# Patient Record
Sex: MALE | Race: OTHER | HISPANIC OR LATINO | URBAN - METROPOLITAN AREA
[De-identification: names, ages, dates, MRNs, and addresses within clinical notes are randomized per-mention and may not be internally consistent; named-entity substitution may affect disease eponyms.]

---

## 2020-12-18 ENCOUNTER — EMERGENCY (EMERGENCY)
Facility: HOSPITAL | Age: 40
LOS: 1 days | Discharge: HOME | End: 2020-12-18
Attending: EMERGENCY MEDICINE
Payer: COMMERCIAL

## 2020-12-18 VITALS
SYSTOLIC BLOOD PRESSURE: 166 MMHG | OXYGEN SATURATION: 99 % | HEART RATE: 90 BPM | HEIGHT: 70 IN | WEIGHT: 315 LBS | RESPIRATION RATE: 18 BRPM | DIASTOLIC BLOOD PRESSURE: 63 MMHG | TEMPERATURE: 99 F

## 2020-12-18 LAB — SARS-COV-2 RNA SPEC QL NAA+PROBE: SIGNIFICANT CHANGE UP

## 2020-12-18 PROCEDURE — 87635 SARS-COV-2 COVID-19 AMP PRB: CPT

## 2020-12-18 PROCEDURE — 99283 EMERGENCY DEPT VISIT LOW MDM: CPT

## 2020-12-18 NOTE — ED PROVIDER NOTE - PATIENT PORTAL LINK FT
You can access the FollowMyHealth Patient Portal offered by [facility] by registering at the following website: [email or web address]. By joining [facility]’s FollowMyHealth portal, you will also be able to view your health information using other applications (apps) compatible with our system.

## 2020-12-18 NOTE — ED PROVIDER NOTE - OBJECTIVE STATEMENT
41 yo male with no PMHX presenting to ED requesting COVID swab for return to work. Patient denies illness, fever, SOB, or malaise.

## 2021-01-28 NOTE — ED ADULT TRIAGE NOTE - NS ED NURSE DIRECT TO ROOM YN
OFFICE VISIT      Patient: Sav Nichole Date of Service: 2021   : 1999 MRN: [id number]     SUBJECTIVE:   HISTORY OF PRESENT ILLNESS:  HPI    Routine physical.  Initial visit.  Patient also complains of left hand pain for 1 year.  Was angry and tried to punch his car window.  Was swollen at the time.  Still with occasional pain.  Also bilateral knee pain when kneeling to pray.  Was told when he was 5 years old, he had a knee infection.  Negative redness or swelling.  Negative discoloration.  PAST MEDICAL HISTORY:  History reviewed. No pertinent past medical history.    MEDICATIONS:  No current outpatient medications on file.     No current facility-administered medications for this visit.        ALLERGIES:  ALLERGIES:  No Known Allergies    PAST SURGICAL HISTORY:  History reviewed. No pertinent surgical history.    FAMILY HISTORY:  History reviewed. No pertinent family history.    SOCIAL HISTORY:  Social History     Tobacco Use   • Smoking status: Current Every Day Smoker   • Smokeless tobacco: Current User   Substance Use Topics   • Alcohol use: Never     Frequency: Never   • Drug use: Never       Review of Systems    General-negative weight loss or fever  HEENT-negative redness or congestion  Respiratory-no shortness of breath or wheezing  Cardiovascular-negative chest pain or palpitations  GI-negative nausea or vomiting  -negative blood or dysuria  Skin-negative rash  Neuro-negative numbness or tingling  Musculoskeletal-negative joint redness or swelling  Psych-negative depression or anxiety    OBJECTIVE:     Physical Exam    Visit Vitals  /49 (BP Location: LUE - Left upper extremity, Patient Position: Sitting, Cuff Size: Regular)   Pulse 63   Temp 98.2 °F (36.8 °C)   Resp 18   Ht 6' 1\" (1.854 m)   Wt 99.2 kg (218 lb 11.1 oz)   SpO2 98%   BMI 28.85 kg/m²         Wt Readings from Last 1 Encounters:   21 99.2 kg (218 lb 11.1 oz)        Alert, no distress.  Vital signs  reviewed.  HEENT-negative redness or congestion  Neck-negative adenopathy, thyroid not enlarged  Lungs clear  CV-negative murmur, carotids negative bruits, extremities negative edema  Abdomen-bowel sounds positive, soft, nontender tenderness  Neuro-negative deficits  Skin-negative for rash  Musculoskeletal-negative joint tenderness or swelling  Left hand along fifth metacarpal-nontender.  Negative swelling or discoloration.  Bilateral knees-nontender.  Patient states it hurts at the bony prominence areas.  Below the kneecap.  Usually when he prays.  DIAGNOSTIC STUDIES:   LAB RESULTS:            Assessment AND PLAN:   Assessment   Problem List Items Addressed This Visit        Musculoskeletal    Injury    Relevant Orders    XR HAND 2 VIEWS LEFT    Chronic pain of both knees    Relevant Orders    XR KNEE 1 VIEW BILATERAL       Other    Routine physical examination - Primary    Relevant Orders    CBC WITH DIFFERENTIAL    COMPREHENSIVE METABOLIC PANEL    LIPID PANEL WITH REFLEX    THYROID STIMULATING HORMONE      Other Visit Diagnoses     Need for vaccination              No problem-specific Assessment & Plan notes found for this encounter.        Patient Instructions   Patient Education     4 Steps for Eating Healthier  Changing the way you eat can improve your health. It can lower your cholesterol and blood pressure, and help you stay at a healthy weight. Your diet doesn’t have to be bland and boring to be healthy. Just watch your calories and follow these steps:    Step 1. Eat fewer unhealthy fats  · Choose more fish and lean meats instead of fatty cuts of meat.  · Skip butter and lard, and use less margarine.  · Pass on foods that have palm, coconut, or hydrogenated oils.  · Eat fewer high-fat dairy foods like cheese, ice cream, and whole milk.  · Get a heart-healthy cookbook and try some low-fat recipes.  Step 2. Go light on salt  · Keep the saltshaker off the table.  · Limit high-salt ingredients, such as soy  sauce, bouillon, and garlic salt.  · Instead of adding salt when cooking, season your food with herbs and flavorings. Try lemon, garlic, and onion, or salt-free herb seasonings.  · Limit convenience foods, such as boxed or canned foods and restaurant food.  · Read food labels and choose lower-sodium options.  Step 3. Limit sugar  · Pause before you add sugars to pancakes, cereal, coffee, or tea. This includes white and brown table sugar, syrup, honey, and molasses. Cut your usual amount by half.  · Use non-sugar sweeteners. Stevia, aspartame, and sucralose can satisfy a sweet tooth without adding calories.  · Swap out sugar-filled soda and other drinks. Buy sugar-free or low-calorie beverages. Remember water is always the best choice.  · Read labels and choose foods with less added sugar. Keep in mind that dairy foods and foods with fruit will have some natural sugar.  · Cut the sugar in recipes by 1/3 to 1/2. Boost the flavor with extracts like almond, vanilla, or orange. Or add spices such as cinnamon or nutmeg.  Step 4. Eat more fiber  · Eat fresh fruits and vegetables every day.  · Boost your diet with whole grains. Go for oats, whole-grain rice, and bran.  · Add beans and lentils to your meals.  · Drink more water to match your fiber increase to help prevent constipation.  Date Last Reviewed: 6/1/2017  © 1704-5210 [employer]. [address] [place] [address], [place] [address]. All rights reserved. This information is not intended as a substitute for professional medical care. Always follow your healthcare professional's instructions.         Assessment and plan-    1 routine physical-discussed diet and activity.  Check routine labs.  2 left hand pain-check x-ray.  If x-ray negative.  May be scar tissue.  3 bilateral knee pain-probably from pressure on knee bones.  Will check x-rays.      Return if symptoms worsen or fail to improve.    Instructions provided as documented in the AVS.      The patient  indicated understanding of the diagnosis and agreed with the plan of care.      Juan Carlos Rivera MD  1/28/2021    Yes

## 2025-01-23 NOTE — ED ADULT NURSE NOTE - CAS ELECT INFOMATION PROVIDED
pt seen and examined MD/ACP . Pt swab for COVID 19 and discharge  by MD/ACP/DC instructions c/o positional dizziness   MRA neck unremarkable  MRI S/o acute/subacute stroke in posterior circulation  TTE negative and CTA head negative  TTE:  1. Normal left and right ventricular size and systolic function.   2. No LA/RA/DIDIER/RAA thrombus seen.   3. Injectionof agitated saline via a peripheral vein reveals no   evidence of a right-to-left shunt.   4. Mild mitral regurgitation.   5. There is minimal plaque seen in the visualized portion of the   descending aorta. There is no significant plaque seen in thevisualized   portion of the aortic arch.   6. No pericardial effusion.  CTA head negative  Plan  - fu neuro and stroke recs  - c/w aspirin and statin 80mg #Cerebellar stroke with Dizziness and Ataxia    MRI S/o acute/subacute stroke in posterior circulation. MRA neck and CTA head unremarkable  vEEG read from 1/16: no evidence of seizures  TTE negative for PFO (normal biventricular fxn).  Lipid panel (Total: 85 HDL: 31 LDL: 36)     - fu neuro and stroke recs  - c/w aspirin and statin 80mg qhs  - c/w meclizine 25mg q12h PRN   - c/w thiamine 100mg qd